# Patient Record
Sex: FEMALE | Race: WHITE | NOT HISPANIC OR LATINO
[De-identification: names, ages, dates, MRNs, and addresses within clinical notes are randomized per-mention and may not be internally consistent; named-entity substitution may affect disease eponyms.]

---

## 2020-02-12 ENCOUNTER — RESULT REVIEW (OUTPATIENT)
Age: 65
End: 2020-02-12

## 2021-02-24 ENCOUNTER — APPOINTMENT (OUTPATIENT)
Dept: BREAST CENTER | Facility: CLINIC | Age: 66
End: 2021-02-24

## 2021-02-25 PROBLEM — Z00.00 ENCOUNTER FOR PREVENTIVE HEALTH EXAMINATION: Status: ACTIVE | Noted: 2021-02-25

## 2021-03-01 DIAGNOSIS — Z87.891 PERSONAL HISTORY OF NICOTINE DEPENDENCE: ICD-10-CM

## 2021-03-01 DIAGNOSIS — Z78.9 OTHER SPECIFIED HEALTH STATUS: ICD-10-CM

## 2021-03-01 DIAGNOSIS — Z80.3 FAMILY HISTORY OF MALIGNANT NEOPLASM OF BREAST: ICD-10-CM

## 2021-03-01 DIAGNOSIS — Z80.6 FAMILY HISTORY OF LEUKEMIA: ICD-10-CM

## 2021-03-01 DIAGNOSIS — N60.92 UNSPECIFIED BENIGN MAMMARY DYSPLASIA OF LEFT BREAST: ICD-10-CM

## 2021-03-01 DIAGNOSIS — D05.12 INTRADUCTAL CARCINOMA IN SITU OF LEFT BREAST: ICD-10-CM

## 2021-03-01 DIAGNOSIS — Z86.79 PERSONAL HISTORY OF OTHER DISEASES OF THE CIRCULATORY SYSTEM: ICD-10-CM

## 2021-03-01 DIAGNOSIS — Z80.7 FAMILY HISTORY OF OTHER MALIGNANT NEOPLASMS OF LYMPHOID, HEMATOPOIETIC AND RELATED TISSUES: ICD-10-CM

## 2021-03-01 RX ORDER — HYDROCHLOROTHIAZIDE 25 MG/1
25 TABLET ORAL
Refills: 0 | Status: ACTIVE | COMMUNITY

## 2021-03-04 ENCOUNTER — APPOINTMENT (OUTPATIENT)
Dept: BREAST CENTER | Facility: CLINIC | Age: 66
End: 2021-03-04
Payer: COMMERCIAL

## 2021-03-04 VITALS — BODY MASS INDEX: 25.1 KG/M2 | WEIGHT: 147 LBS | HEIGHT: 64 IN

## 2021-03-04 DIAGNOSIS — F51.01 PRIMARY INSOMNIA: ICD-10-CM

## 2021-03-04 PROCEDURE — 99213 OFFICE O/P EST LOW 20 MIN: CPT

## 2021-03-04 PROCEDURE — 99072 ADDL SUPL MATRL&STAF TM PHE: CPT

## 2021-03-04 RX ORDER — ZOLPIDEM TARTRATE 5 MG/1
5 TABLET, FILM COATED ORAL
Refills: 0 | Status: ACTIVE | COMMUNITY

## 2021-03-04 NOTE — REASON FOR VISIT
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  She has a BRCA1 mutation which was reclassified as a polymorphism but did undergo bilateral prophylactic nipple sparing mastectomies with direct implant reconstructions in 2015.  She comes in for routine follow-up.

## 2021-03-04 NOTE — PHYSICAL EXAM
[Normocephalic] : normocephalic [Atraumatic] : atraumatic [EOMI] : extra ocular movement intact [Supple] : supple [No Supraclavicular Adenopathy] : no supraclavicular adenopathy [No Cervical Adenopathy] : no cervical adenopathy [Examined in the supine and seated position] : examined in the supine and seated position [No dominant masses] : no dominant masses in right breast  [No dominant masses] : no dominant masses left breast [No Nipple Retraction] : no left nipple retraction [No Nipple Discharge] : no left nipple discharge [Breast Mass Right Breast ___cm] : no masses [Breast Mass Left Breast ___cm] : no masses [Breast Nipple Inversion] : nipples not inverted [Breast Nipple Retraction] : nipples not retracted [Breast Nipple Flattening] : nipples not flattened [Breast Nipple Fissures] : nipples not fissured [Breast Abnormal Lactation (Galactorrhea)] : no galactorrhea [Breast Abnormal Secretion Bloody Fluid] : no bloody discharge [Breast Abnormal Secretion Serous Fluid] : no serous discharge [Breast Abnormal Secretion Opalescent Fluid] : no milky discharge [No Axillary Lymphadenopathy] : no left axillary lymphadenopathy [No Edema] : no edema [No Rashes] : no rashes [No Ulceration] : no ulceration [de-identified] : On exam, the patient has obvious bilateral nipple sparing mastectomies with direct to implant reconstruction.  I cannot feel any suspicious densities over either implant.  She has no axillary, supraclavicular, or cervical adenopathy. [de-identified] : Status post nipple sparing mastectomy with direct to implant reconstruction [de-identified] : Status post nipple sparing mastectomy with direct to implant reconstruction

## 2021-03-04 NOTE — ASSESSMENT
[FreeTextEntry1] : The patient is a 65-year-old  postmenopausal white female with no history of any hormone replacement therapy.  She has a strong family history with her sister who had LCIS and a maternal aunt who had breast cancer at age 62.  A paternal aunt had breast cancer in her 80s.  The patient's father had CLL and lymphoma.  She has a history of a left breast 2:00 cyst aspiration in 2009.  She was found to have an abnormal mammography and ultrasound in 2014 with a vague distortion in the left breast 2:00 region and an 8 mm density on ultrasound.  Ultrasound-guided core biopsy showed an intermediate grade DCIS which was ER/AR negative.  MRI showed a couple areas of suspicion in the left breast for which she underwent MRI guided core biopsies which turned out to be benign.  She underwent comprehensive BRCA testing in  which was reclassified as a BRCA1 polymorphism in .  She underwent a left breast wide excision on 2015 with the final pathology showing free margins and high-grade DCIS.  She had 1 negative sentinel lymph node and one negative nonsentinel lymph node.  The patient then decided on undergoing bilateral wrist reduction prophylactic nipple sparing mastectomies which were performed on 2015 which just showed some residual atypia on the left side.  On exam, I cannot feel any suspicious densities over either implant reconstruction.  The patient was reassured and should continue yearly follow-up and we will see her again in 2022.

## 2021-03-04 NOTE — HISTORY OF PRESENT ILLNESS
[FreeTextEntry1] : The patient is a 65-year-old  postmenopausal white female with no history of any hormone replacement therapy.  She has a strong family history with her sister who had LCIS and a maternal aunt who had breast cancer at age 62.  A paternal aunt had breast cancer in her 80s.  The patient's father had CLL and lymphoma.  She has a history of a left breast 2:00 cyst aspiration in 2009.  She was found to have an abnormal mammography and ultrasound in 2014 with a vague distortion in the left breast 2:00 region and an 8 mm density on ultrasound.  Ultrasound-guided core biopsy showed an intermediate grade DCIS which was ER/VA negative.  MRI showed a couple areas of suspicion in the left breast for which she underwent MRI guided core biopsies which turned out to be benign.  She underwent comprehensive BRCA testing in  which was reclassified as a BRCA1 polymorphism in 2018.  She underwent a left breast wide excision on 2015 with the final pathology showing free margins and high-grade DCIS.  She had 1 negative sentinel lymph node and one negative nonsentinel lymph node.  The patient then decided on undergoing bilateral wrist reduction prophylactic nipple sparing mastectomies which were performed on 2015 which just showed some residual atypia on the left side.  She comes in now for routine yearly exam.

## 2021-03-04 NOTE — PAST MEDICAL HISTORY
[Postmenopausal] : The patient is postmenopausal [History of Hormone Replacement Treatment] : has no history of hormone replacement treatment [Total Preg ___] : G[unfilled] [Live Births ___] : P[unfilled]

## 2021-04-12 ENCOUNTER — RESULT REVIEW (OUTPATIENT)
Age: 66
End: 2021-04-12

## 2022-12-26 NOTE — ASSESSMENT
[FreeTextEntry1] : The patient is a 67-year-old  postmenopausal white female with no history of any hormone replacement therapy.  She has a strong family history with her sister who had LCIS and a maternal aunt who had breast cancer at age 62.  A paternal aunt had breast cancer in her 80s.  The patient's father had CLL and lymphoma.  She has a history of a left breast 2:00 cyst aspiration in 2009.  She was found to have an abnormal mammography and ultrasound in 2014 with a vague distortion in the left breast 2:00 region and an 8 mm density on ultrasound.  Ultrasound-guided core biopsy showed an intermediate grade DCIS which was ER/NE negative.  MRI showed a couple areas of suspicion in the left breast for which she underwent MRI guided core biopsies which turned out to be benign.  She underwent comprehensive BRCA testing in  which was reclassified as a BRCA1 polymorphism in .  She underwent a left breast wide excision on 2015 with the final pathology showing free margins and high-grade DCIS.  She had 1 negative sentinel lymph node and one negative nonsentinel lymph node.  The patient then decided on undergoing bilateral risk reduction prophylactic nipple sparing mastectomies which were performed on 2015 which just showed some residual atypia on the left side.  On exam, I cannot feel any suspicious densities over either implant reconstruction.  The patient was reassured and should continue yearly follow-up and I will see her again in 2023.

## 2022-12-26 NOTE — PHYSICAL EXAM
[Normocephalic] : normocephalic [Atraumatic] : atraumatic [EOMI] : extra ocular movement intact [Supple] : supple [No Supraclavicular Adenopathy] : no supraclavicular adenopathy [No Cervical Adenopathy] : no cervical adenopathy [Examined in the supine and seated position] : examined in the supine and seated position [No dominant masses] : no dominant masses in right breast  [No dominant masses] : no dominant masses left breast [No Nipple Retraction] : no left nipple retraction [No Nipple Discharge] : no left nipple discharge [Breast Mass Right Breast ___cm] : no masses [Breast Mass Left Breast ___cm] : no masses [Breast Nipple Inversion] : nipples not inverted [Breast Nipple Flattening] : nipples not flattened [Breast Nipple Retraction] : nipples not retracted [Breast Nipple Fissures] : nipples not fissured [Breast Abnormal Lactation (Galactorrhea)] : no galactorrhea [Breast Abnormal Secretion Bloody Fluid] : no bloody discharge [Breast Abnormal Secretion Serous Fluid] : no serous discharge [Breast Abnormal Secretion Opalescent Fluid] : no milky discharge [No Axillary Lymphadenopathy] : no left axillary lymphadenopathy [No Edema] : no edema [No Rashes] : no rashes [No Ulceration] : no ulceration [de-identified] : On exam, the patient has obvious bilateral nipple sparing mastectomies with direct to implant reconstruction.  I cannot feel any suspicious densities over either implant.  She has no axillary, supraclavicular, or cervical adenopathy. [de-identified] : Status post nipple sparing mastectomy with direct to implant reconstruction [de-identified] : Status post nipple sparing mastectomy with direct to implant reconstruction

## 2022-12-26 NOTE — REASON FOR VISIT
[Follow-Up: _____] : a [unfilled] follow-up visit [FreeTextEntry1] : The patient comes in for routine follow-up with a history of a left breast high-grade DCIS which was ER [place] negative diagnosed back in 2014.  She has a BRCA1 mutation which was reclassified as a polymorphism but did undergo bilateral prophylactic nipple sparing mastectomies with direct implant reconstructions in 2015.  She comes in for routine follow-up.

## 2022-12-26 NOTE — HISTORY OF PRESENT ILLNESS
[FreeTextEntry1] : The patient is a 67-year-old  postmenopausal white female with no history of any hormone replacement therapy.  She has a strong family history with her sister who had LCIS and a maternal aunt who had breast cancer at age 62.  A paternal aunt had breast cancer in her 80s.  The patient's father had CLL and lymphoma.  She has a history of a left breast 2:00 cyst aspiration in 2009.  She was found to have an abnormal mammography and ultrasound in 2014 with a vague distortion in the left breast 2:00 region and an 8 mm density on ultrasound.  Ultrasound-guided core biopsy showed an intermediate grade DCIS which was ER/SC negative.  MRI showed a couple areas of suspicion in the left breast for which she underwent MRI guided core biopsies which turned out to be benign.  She underwent comprehensive BRCA testing in  which was reclassified as a BRCA1 polymorphism in 2018.  She underwent a left breast wide excision on 2015 with the final pathology showing free margins and high-grade DCIS.  She had 1 negative sentinel lymph node and one negative nonsentinel lymph node.  The patient then decided on undergoing bilateral risk reduction prophylactic nipple sparing mastectomies which were performed on 2015 which just showed some residual atypia on the left side.  She comes in now for routine yearly exam.

## 2022-12-30 ENCOUNTER — APPOINTMENT (OUTPATIENT)
Dept: BREAST CENTER | Facility: CLINIC | Age: 67
End: 2022-12-30
Payer: COMMERCIAL

## 2023-01-04 ENCOUNTER — APPOINTMENT (OUTPATIENT)
Dept: BREAST CENTER | Facility: CLINIC | Age: 68
End: 2023-01-04
Payer: COMMERCIAL

## 2023-01-04 DIAGNOSIS — Z15.01 GENETIC SUSCEPTIBILITY TO MALIGNANT NEOPLASM OF BREAST: ICD-10-CM

## 2023-01-04 DIAGNOSIS — Z15.09 GENETIC SUSCEPTIBILITY TO MALIGNANT NEOPLASM OF BREAST: ICD-10-CM

## 2023-01-04 PROCEDURE — 99213 OFFICE O/P EST LOW 20 MIN: CPT

## 2023-01-04 NOTE — REASON FOR VISIT
[Follow-Up: _____] : a [unfilled] follow-up visit [FreeTextEntry1] : The patient comes in for routine follow-up with a history of a left breast high-grade DCIS which was ER DC negative diagnosed back in 2014.  She has a BRCA1 mutation which was reclassified as a polymorphism but did undergo bilateral prophylactic nipple sparing mastectomies with direct implant reconstructions in 2015.  She comes in for routine follow-up.

## 2023-01-04 NOTE — HISTORY OF PRESENT ILLNESS
[FreeTextEntry1] : The patient is a 67-year-old  postmenopausal white female with no history of any hormone replacement therapy.  She has a strong family history with her sister who had LCIS and a maternal aunt who had breast cancer at age 62.  A paternal aunt had breast cancer in her 80s.  The patient's father had CLL and lymphoma.  She has a history of a left breast 2:00 cyst aspiration in 2009.  She was found to have an abnormal mammography and ultrasound in 2014 with a vague distortion in the left breast 2:00 region and an 8 mm density on ultrasound.  Ultrasound-guided core biopsy showed an intermediate grade DCIS which was ER/ND negative.  MRI showed a couple areas of suspicion in the left breast for which she underwent MRI guided core biopsies which turned out to be benign.  She underwent comprehensive BRCA testing in  which was reclassified as a BRCA1 polymorphism in 2018.  She underwent a left breast wide excision on 2015 with the final pathology showing free margins and high-grade DCIS.  She had 1 negative sentinel lymph node and one negative nonsentinel lymph node.  The patient then decided on undergoing bilateral risk reduction prophylactic nipple sparing mastectomies which were performed on 2015 which just showed some residual atypia on the left side.  She comes in now for routine yearly exam.

## 2023-01-04 NOTE — ASSESSMENT
[FreeTextEntry1] : The patient is a 67-year-old  postmenopausal white female with no history of any hormone replacement therapy.  She has a strong family history with her sister who had LCIS and a maternal aunt who had breast cancer at age 62.  A paternal aunt had breast cancer in her 80s.  The patient's father had CLL and lymphoma.  She has a history of a left breast 2:00 cyst aspiration in 2009.  She was found to have an abnormal mammography and ultrasound in 2014 with a vague distortion in the left breast 2:00 region and an 8 mm density on ultrasound.  Ultrasound-guided core biopsy showed an intermediate grade DCIS which was ER/MD negative.  MRI showed a couple areas of suspicion in the left breast for which she underwent MRI guided core biopsies which turned out to be benign.  She underwent comprehensive BRCA testing in  which was reclassified as a BRCA1 polymorphism in .  She underwent a left breast wide excision on 2015 with the final pathology showing free margins and high-grade DCIS.  She had 1 negative sentinel lymph node and one negative nonsentinel lymph node.  The patient then decided on undergoing bilateral risk reduction prophylactic nipple sparing mastectomies which were performed on 2015 which just showed some residual atypia on the left side.  On exam, I cannot feel any suspicious densities over either implant reconstruction.  The patient was reassured and should continue yearly follow-up and I will see her again in 2024.

## 2023-01-04 NOTE — PHYSICAL EXAM
[Normocephalic] : normocephalic [Atraumatic] : atraumatic [EOMI] : extra ocular movement intact [Supple] : supple [No Supraclavicular Adenopathy] : no supraclavicular adenopathy [No Cervical Adenopathy] : no cervical adenopathy [Examined in the supine and seated position] : examined in the supine and seated position [No dominant masses] : no dominant masses in right breast  [No dominant masses] : no dominant masses left breast [No Nipple Retraction] : no left nipple retraction [No Nipple Discharge] : no left nipple discharge [Breast Mass Right Breast ___cm] : no masses [Breast Mass Left Breast ___cm] : no masses [No Axillary Lymphadenopathy] : no left axillary lymphadenopathy [No Edema] : no edema [No Rashes] : no rashes [No Ulceration] : no ulceration [Breast Nipple Inversion] : nipples not inverted [Breast Nipple Retraction] : nipples not retracted [Breast Nipple Flattening] : nipples not flattened [Breast Nipple Fissures] : nipples not fissured [Breast Abnormal Lactation (Galactorrhea)] : no galactorrhea [Breast Abnormal Secretion Bloody Fluid] : no bloody discharge [Breast Abnormal Secretion Serous Fluid] : no serous discharge [Breast Abnormal Secretion Opalescent Fluid] : no milky discharge [de-identified] : On exam, the patient has obvious bilateral nipple sparing mastectomies with direct to implant reconstruction.  I cannot feel any suspicious densities over either implant.  She has no axillary, supraclavicular, or cervical adenopathy. [de-identified] : Status post nipple sparing mastectomy with direct to implant reconstruction [de-identified] : Status post nipple sparing mastectomy with direct to implant reconstruction

## 2023-12-19 ENCOUNTER — APPOINTMENT (OUTPATIENT)
Dept: PULMONOLOGY | Facility: CLINIC | Age: 68
End: 2023-12-19

## 2023-12-22 ENCOUNTER — NON-APPOINTMENT (OUTPATIENT)
Age: 68
End: 2023-12-22

## 2023-12-27 NOTE — REASON FOR VISIT
[Follow-Up: _____] : a [unfilled] follow-up visit [FreeTextEntry1] : The patient comes in for routine follow-up with a history of a left breast high-grade DCIS which was ER [place] negative diagnosed back in 2014.  She has a BRCA1 mutation which was reclassified as a polymorphism but did undergo bilateral prophylactic nipple sparing mastectomies with direct implant reconstructions in 2015.  She comes in for routine follow-up.

## 2023-12-27 NOTE — HISTORY OF PRESENT ILLNESS
[FreeTextEntry1] : The patient is a 68-year-old  postmenopausal white female with no history of any hormone replacement therapy.  She has a strong family history with her sister who had LCIS and a maternal aunt who had breast cancer at age 62.  A paternal aunt had breast cancer in her 80s.  The patient's father had CLL and lymphoma.  She has a history of a left breast 2:00 cyst aspiration in 2009.  She was found to have an abnormal mammography and ultrasound in 2014 with a vague distortion in the left breast 2:00 region and an 8 mm density on ultrasound.  Ultrasound-guided core biopsy showed an intermediate grade DCIS which was ER/DE negative.  MRI showed a couple areas of suspicion in the left breast for which she underwent MRI guided core biopsies which turned out to be benign.  She underwent comprehensive BRCA testing in  which was reclassified as a BRCA1 polymorphism in 2018.  She underwent a left breast wide excision on 2015 with the final pathology showing free margins and high-grade DCIS.  She had 1 negative sentinel lymph node and one negative nonsentinel lymph node.  The patient then decided on undergoing bilateral risk reduction prophylactic nipple sparing mastectomies which were performed on 2015 which just showed some residual atypia on the left side.  She comes in now for routine yearly exam.

## 2023-12-27 NOTE — PHYSICAL EXAM
[Normocephalic] : normocephalic [Atraumatic] : atraumatic [EOMI] : extra ocular movement intact [Supple] : supple [No Supraclavicular Adenopathy] : no supraclavicular adenopathy [No Cervical Adenopathy] : no cervical adenopathy [Examined in the supine and seated position] : examined in the supine and seated position [No dominant masses] : no dominant masses in right breast  [No dominant masses] : no dominant masses left breast [No Nipple Retraction] : no left nipple retraction [No Nipple Discharge] : no left nipple discharge [Breast Mass Right Breast ___cm] : no masses [Breast Mass Left Breast ___cm] : no masses [No Axillary Lymphadenopathy] : no left axillary lymphadenopathy [No Edema] : no edema [No Rashes] : no rashes [No Ulceration] : no ulceration [Breast Nipple Inversion] : nipples not inverted [Breast Nipple Retraction] : nipples not retracted [Breast Nipple Flattening] : nipples not flattened [Breast Nipple Fissures] : nipples not fissured [Breast Abnormal Lactation (Galactorrhea)] : no galactorrhea [Breast Abnormal Secretion Bloody Fluid] : no bloody discharge [Breast Abnormal Secretion Serous Fluid] : no serous discharge [Breast Abnormal Secretion Opalescent Fluid] : no milky discharge [de-identified] : On exam, the patient has obvious bilateral nipple sparing mastectomies with direct to implant reconstruction.  I cannot feel any suspicious densities over either implant.  She has no axillary, supraclavicular, or cervical adenopathy. [de-identified] : Status post nipple sparing mastectomy with direct to implant reconstruction [de-identified] : Status post nipple sparing mastectomy with direct to implant reconstruction

## 2023-12-27 NOTE — ASSESSMENT
[FreeTextEntry1] : The patient is a 68-year-old  postmenopausal white female with no history of any hormone replacement therapy. She has a strong family history with her sister who had LCIS and a maternal aunt who had breast cancer at age 62. A paternal aunt had breast cancer in her 80s. The patient's father had CLL and lymphoma. She has a history of a left breast 2:00 cyst aspiration in 2009. She was found to have an abnormal mammography and ultrasound in 2014 with a vague distortion in the left breast 2:00 region and an 8 mm density on ultrasound. Ultrasound-guided core biopsy showed an intermediate grade DCIS which was ER/[place] negative. MRI showed a couple areas of suspicion in the left breast for which she underwent MRI guided core biopsies which turned out to be benign. She underwent comprehensive BRCA testing in  which was reclassified as a BRCA1 polymorphism in . She underwent a left breast wide excision on 2015 with the final pathology showing free margins and high-grade DCIS. She had 1 negative sentinel lymph node and one negative nonsentinel lymph node. The patient then decided on undergoing bilateral risk reduction prophylactic nipple sparing mastectomies which were performed on 2015 which just showed some residual atypia on the left side. On exam, I cannot feel any suspicious densities over either implant reconstruction. The patient was reassured and should continue yearly follow-up and I will see her again in 2025.

## 2024-05-13 ENCOUNTER — APPOINTMENT (OUTPATIENT)
Dept: BREAST CENTER | Facility: CLINIC | Age: 69
End: 2024-05-13
Payer: MEDICARE

## 2024-05-13 VITALS
WEIGHT: 147 LBS | HEART RATE: 81 BPM | SYSTOLIC BLOOD PRESSURE: 136 MMHG | BODY MASS INDEX: 25.1 KG/M2 | HEIGHT: 64 IN | DIASTOLIC BLOOD PRESSURE: 81 MMHG | OXYGEN SATURATION: 96 %

## 2024-05-13 DIAGNOSIS — Z12.39 ENCOUNTER FOR OTHER SCREENING FOR MALIGNANT NEOPLASM OF BREAST: ICD-10-CM

## 2024-05-13 DIAGNOSIS — Z85.3 PERSONAL HISTORY OF MALIGNANT NEOPLASM OF BREAST: ICD-10-CM

## 2024-05-13 DIAGNOSIS — Z90.13 ACQUIRED ABSENCE OF BILATERAL BREASTS AND NIPPLES: ICD-10-CM

## 2024-05-13 PROCEDURE — 99212 OFFICE O/P EST SF 10 MIN: CPT

## 2024-05-13 PROCEDURE — G2211 COMPLEX E/M VISIT ADD ON: CPT

## 2024-05-13 NOTE — HISTORY OF PRESENT ILLNESS
[FreeTextEntry1] : The patient is a 68-year-old  postmenopausal white female with no history of any hormone replacement therapy.  She has a strong family history with her sister who had LCIS and a maternal aunt who had breast cancer at age 62.  A paternal aunt had breast cancer in her 80s.  The patient's father had CLL and lymphoma.  She has a history of a left breast 2:00 cyst aspiration in 2009.  She was found to have an abnormal mammography and ultrasound in 2014 with a vague distortion in the left breast 2:00 region and an 8 mm density on ultrasound.  Ultrasound-guided core biopsy showed an intermediate grade DCIS which was ER/AK negative.  MRI showed a couple areas of suspicion in the left breast for which she underwent MRI guided core biopsies which turned out to be benign.  She underwent comprehensive BRCA testing in  which was reclassified as a BRCA1 polymorphism in 2018.  She underwent a left breast wide excision on 2015 with the final pathology showing free margins and high-grade DCIS.  She had 1 negative sentinel lymph node and one negative nonsentinel lymph node.  The patient then decided on undergoing bilateral risk reduction prophylactic nipple sparing mastectomies which were performed on 2015 which just showed some residual atypia on the left side.  She comes in now for routine yearly exam.

## 2024-05-13 NOTE — REASON FOR VISIT
[Follow-Up: _____] : a [unfilled] follow-up visit [FreeTextEntry1] : The patient comes in for routine follow-up with a history of a left breast high-grade DCIS which was ER [place] negative diagnosed back in 2014.  She has a BRCA1 mutation which was reclassified as a polymorphism but did undergo bilateral prophylactic nipple sparing mastectomies with direct implant reconstructions in 2015.  She comes in for routine follow-up.

## 2024-05-13 NOTE — ASSESSMENT
[FreeTextEntry1] : The patient is a 68-year-old  postmenopausal white female with no history of any hormone replacement therapy. She has a strong family history with her sister who had LCIS and a maternal aunt who had breast cancer at age 62. A paternal aunt had breast cancer in her 80s. The patient's father had CLL and lymphoma. She has a history of a left breast 2:00 cyst aspiration in 2009. She was found to have an abnormal mammography and ultrasound in 2014 with a vague distortion in the left breast 2:00 region and an 8 mm density on ultrasound. Ultrasound-guided core biopsy showed an intermediate grade DCIS which was ER/SD negative. MRI showed a couple areas of suspicion in the left breast for which she underwent MRI guided core biopsies which turned out to be benign. She underwent comprehensive BRCA testing in  which was reclassified as a BRCA1 polymorphism in . She underwent a left breast wide excision on 2015 with the final pathology showing free margins and high-grade DCIS. She had 1 negative sentinel lymph node and one negative nonsentinel lymph node. The patient then decided on undergoing bilateral risk reduction prophylactic nipple sparing mastectomies which were performed on 2015 which just showed some residual atypia on the left side. On exam, I cannot feel any suspicious densities over either implant reconstruction. The patient was reassured and should continue yearly follow-up and I will see her again in May 2025.

## 2024-05-13 NOTE — PHYSICAL EXAM
[Normocephalic] : normocephalic [Atraumatic] : atraumatic [EOMI] : extra ocular movement intact [Supple] : supple [No Supraclavicular Adenopathy] : no supraclavicular adenopathy [No Cervical Adenopathy] : no cervical adenopathy [Examined in the supine and seated position] : examined in the supine and seated position [No dominant masses] : no dominant masses in right breast  [No dominant masses] : no dominant masses left breast [No Nipple Retraction] : no left nipple retraction [No Nipple Discharge] : no left nipple discharge [Breast Mass Left Breast ___cm] : no masses [Breast Mass Right Breast ___cm] : no masses [No Axillary Lymphadenopathy] : no left axillary lymphadenopathy [No Edema] : no edema [No Rashes] : no rashes [No Ulceration] : no ulceration [Breast Nipple Inversion] : nipples not inverted [Breast Nipple Retraction] : nipples not retracted [Breast Nipple Flattening] : nipples not flattened [Breast Nipple Fissures] : nipples not fissured [Breast Abnormal Lactation (Galactorrhea)] : no galactorrhea [Breast Abnormal Secretion Bloody Fluid] : no bloody discharge [Breast Abnormal Secretion Serous Fluid] : no serous discharge [Breast Abnormal Secretion Opalescent Fluid] : no milky discharge [de-identified] : On exam, the patient has obvious bilateral nipple sparing mastectomies with direct to implant reconstruction.  I cannot feel any suspicious densities over either implant.  She has no axillary, supraclavicular, or cervical adenopathy. [de-identified] : Status post nipple sparing mastectomy with direct to implant reconstruction [de-identified] : Status post nipple sparing mastectomy with direct to implant reconstruction

## 2025-03-31 ENCOUNTER — NON-APPOINTMENT (OUTPATIENT)
Age: 70
End: 2025-03-31

## 2025-05-12 ENCOUNTER — NON-APPOINTMENT (OUTPATIENT)
Age: 70
End: 2025-05-12

## 2025-05-14 ENCOUNTER — APPOINTMENT (OUTPATIENT)
Dept: BREAST CENTER | Facility: CLINIC | Age: 70
End: 2025-05-14
Payer: MEDICARE

## 2025-05-14 ENCOUNTER — TRANSCRIPTION ENCOUNTER (OUTPATIENT)
Age: 70
End: 2025-05-14

## 2025-05-14 VITALS
HEART RATE: 68 BPM | SYSTOLIC BLOOD PRESSURE: 128 MMHG | WEIGHT: 130 LBS | DIASTOLIC BLOOD PRESSURE: 83 MMHG | OXYGEN SATURATION: 98 % | HEIGHT: 64 IN | BODY MASS INDEX: 22.2 KG/M2

## 2025-05-14 DIAGNOSIS — N60.92 UNSPECIFIED BENIGN MAMMARY DYSPLASIA OF LEFT BREAST: ICD-10-CM

## 2025-05-14 DIAGNOSIS — Z90.13 ACQUIRED ABSENCE OF BILATERAL BREASTS AND NIPPLES: ICD-10-CM

## 2025-05-14 DIAGNOSIS — Z85.3 PERSONAL HISTORY OF MALIGNANT NEOPLASM OF BREAST: ICD-10-CM

## 2025-05-14 DIAGNOSIS — Z12.39 ENCOUNTER FOR OTHER SCREENING FOR MALIGNANT NEOPLASM OF BREAST: ICD-10-CM

## 2025-05-14 PROCEDURE — 99213 OFFICE O/P EST LOW 20 MIN: CPT
